# Patient Record
(demographics unavailable — no encounter records)

---

## 2024-10-22 NOTE — LETTER BODY
[Dear  ___] : Dear  [unfilled], [FreeTextEntry2] : Henri Canales M.D. [address] [place] [id number] [FreeTextEntry1] : I had the pleasure of seeing your patient,  HARLAN CEDILLO, a 79 year old man, in the office in consultation today. Please see the attached note for full details.  Thank you very much for allowing me to participate in the care of this patient. If you have any questions please feel free to call me at any time.    Sincerely yours,    Андрей Allan MD, GUERRERO Director, Male Fertility and Microsurgery  of [facility]

## 2024-10-22 NOTE — END OF VISIT
[FreeTextEntry3] : I, Dr. Allan, personally performed the evaluation and management (E/M) services for this new patient.  That E/M includes conducting the clinically appropriate initial history &/or exam, assessing all conditions, and establishing the plan of care.  Today, my BOY, Luis Fernando Arnold, was here to observe my evaluation and management service for this patient & follow plan of care established by me going forward.

## 2024-10-22 NOTE — HISTORY OF PRESENT ILLNESS
[FreeTextEntry1] : HARLAN CEDILLO is a 79 year M with PMHx of HTN, CVA (intraocular), HLD presenting for elevated PSA on 10/22/2024  On: ASA 81  He reports - reports exercise with free weights  - elevated PSA with PCP for 3 years  - Trend: 6/2020 2.9 1/2021 4.0 2/2022 3.8 9/2022 4.0 3/2023 5.5 4/2023 4.29 8/2023 5.6 11/2023 4.4 4/2024 6.31 8/2024 7.1  He denies incontinence hesitancy fever chills n/v/d dysuria hematuria   Social history; tobacco smoking x 18 years    Family history: Paternal prostate ca, dx 50-60s;    Allergies: Augmentin

## 2024-10-22 NOTE — PHYSICAL EXAM
[Normal Appearance] : normal appearance [General Appearance - In No Acute Distress] : no acute distress [Edema] : no peripheral edema [] : no respiratory distress [Exaggerated Use Of Accessory Muscles For Inspiration] : no accessory muscle use [Abdomen Soft] : soft [Abdomen Tenderness] : non-tender [Abdomen Hernia] : no hernia was discovered [Rectal Exam - Rectum] : digital rectal exam was normal [Prostate Enlargement] : the prostate was not enlarged [Prostate Tenderness] : the prostate was not tender [No Prostate Nodules] : no prostate nodules [Normal Station and Gait] : the gait and station were normal for the patient's age [Oriented To Time, Place, And Person] : oriented to person, place, and time [Affect] : the affect was normal [Chaperone Present] : A chaperone was present in the examining room during all aspects of the physical examination [de-identified] : ~30 g gland, smooth symmetrical no induration [FreeTextEntry2] : DAVIDSON Garland

## 2024-10-22 NOTE — ASSESSMENT
[FreeTextEntry1] : 79M here for follow up for elevated PSA hx   Elevated PSA  - BECCA benign - MR prostate, given family hx and up trending PSA  - if benign, repeat PSA around February  BPH - some urinary urgency, frequency  - no interest in medication   ED  - cialis 20 mg PRN f/u 6 months

## 2024-11-25 NOTE — ASSESSMENT
[FreeTextEntry1] : 78 yo male with elevated PSA and two PIRADs 4 lesions on MRI.  I recommend we proceed with TP fusion targeted prostate biopsy.  Risks of prostate biopsy were discussed including, but not limited to, bleeding, hematuria, infection, abscess, sepsis, rectal injury, urethral injury, or bladder injury, irritative or obstructive urinary symptoms, urinary retention, chronic pain, as well as the potential need for additional procedures.  The patient asked questions and they were answered.   Plan: 1. Schedule TP biopsy 2. PST 3. Medical clearance

## 2024-11-25 NOTE — HISTORY OF PRESENT ILLNESS
[FreeTextEntry1] : 80 yo male referred for elevated PSA and abnormal MRI.  His last PSA was 7.1 in August of this year.  He had an MRI on 11/5/24 which showed two PIRADs 4 lesions (left base PZPL and right mid PZPL).  His prostate is measured at 41.2 cc. His father and brother both have a hx of prostate cancer.

## 2024-12-16 NOTE — ASSESSMENT
[FreeTextEntry1] : 78 yo male schedule for prostate biopsy in 48 hours with recent spontaneous drainage of perianal pustule.  Will send Bactrim DS BID for abx treatment.  He is fine to proceed with biopsy on Wednesday.

## 2024-12-16 NOTE — HISTORY OF PRESENT ILLNESS
[FreeTextEntry1] : 11/25/24: 78 yo male referred for elevated PSA and abnormal MRI.  His last PSA was 7.1 in August of this year.  He had an MRI on 11/5/24 which showed two PIRADs 4 lesions (left base PZPL and right mid PZPL).  His prostate is measured at 41.2 cc. His father and brother both have a hx of prostate cancer.   ************** 12/16/24: Patient returns 2 days prior to his prostate biopsy with concern for a perianal pustule.  he notes that he occasionally will have spontaneously draining perianal pustules.  he notes that 2 days ago he developed another one.  Last night the pustule spontaneously drained.  He denies fevers or chills.

## 2024-12-16 NOTE — PHYSICAL EXAM
[Normal Appearance] : normal appearance [General Appearance - In No Acute Distress] : no acute distress [Heart Rate And Rhythm] : heart rate and rhythm were normal [] : no respiratory distress [Abdomen Soft] : soft [Normal Station and Gait] : the gait and station were normal for the patient's age [Skin Color & Pigmentation] : normal skin color and pigmentation [No Focal Deficits] : no focal deficits [Oriented To Time, Place, And Person] : oriented to person, place, and time [Not Anxious] : not anxious [de-identified] : there is mild induration of left perianal drainage site, no residual fluctuance palpated with minimal tedneress.  There is no erythema noted

## 2024-12-27 NOTE — ASSESSMENT
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    The patient's records were reviewed. An extensive discussion was held with the patient and his wife. The biopsy pathology report and [place] score were reviewed. The chance of organ confined disease, extracapsular extension, seminal vesicle involvement, and lymph node involvement were reviewed.  Options for prostate cancer therapy were discussed. Active surveillance, radical prostatectomy, radiation therapies, cryosurgery, HIFU (high intensity focused ultrasound therapy), hormone therapy, immunotherapy, chemotherapy, and combination/multimodal therapy were reviewed. The role of genomic assays such as decipher score were discussed. The merits and limitations of the various options were discussed with the patient. The patient expressed full understanding and the patient's questions were answered to his satisfaction.  The patient is interested in AS.  We will send a Decipher score to assess his genomic risk stratification before proceeding with AS.

## 2024-12-27 NOTE — HISTORY OF PRESENT ILLNESS
[FreeTextEntry1] : 11/25/24: 80 yo male referred for elevated PSA and abnormal MRI.  His last PSA was 7.1 in August of this year.  He had an MRI on 11/5/24 which showed two PIRADs 4 lesions (left base PZPL and right mid PZPL).  His prostate is measured at 41.2 cc. His father and brother both have a hx of prostate cancer.   ************** 12/16/24: Patient returns 2 days prior to his prostate biopsy with concern for a perianal pustule.  he notes that he occasionally will have spontaneously draining perianal pustules.  he notes that 2 days ago he developed another one.  Last night the pustule spontaneously drained.  He denies fevers or chills.   ************* 12/27/24: Patient returns for follow up to discuss his prostate biopsy pathology.  Report shows GG2 prostate ca in one target lesion with 1 systematic core showing GG1 cancer.  he is otherwise well and denies any discomfort after biopsy.